# Patient Record
Sex: FEMALE | Employment: OTHER | URBAN - METROPOLITAN AREA
[De-identification: names, ages, dates, MRNs, and addresses within clinical notes are randomized per-mention and may not be internally consistent; named-entity substitution may affect disease eponyms.]

---

## 2023-11-02 ENCOUNTER — OFFICE VISIT (OUTPATIENT)
Dept: PODIATRY | Facility: CLINIC | Age: 63
End: 2023-11-02
Payer: MEDICARE

## 2023-11-02 VITALS
HEART RATE: 77 BPM | BODY MASS INDEX: 32.61 KG/M2 | HEIGHT: 64 IN | WEIGHT: 191 LBS | DIASTOLIC BLOOD PRESSURE: 92 MMHG | SYSTOLIC BLOOD PRESSURE: 130 MMHG

## 2023-11-02 DIAGNOSIS — E11.9 COMPREHENSIVE DIABETIC FOOT EXAMINATION, TYPE 2 DM, ENCOUNTER FOR: ICD-10-CM

## 2023-11-02 DIAGNOSIS — M20.11 VALGUS DEFORMITY OF BOTH GREAT TOES: ICD-10-CM

## 2023-11-02 DIAGNOSIS — D21.22 FIBROMA OF LEFT FOOT: ICD-10-CM

## 2023-11-02 DIAGNOSIS — E11.9 TYPE 2 DIABETES MELLITUS WITHOUT COMPLICATION, WITHOUT LONG-TERM CURRENT USE OF INSULIN: Primary | ICD-10-CM

## 2023-11-02 DIAGNOSIS — M20.12 VALGUS DEFORMITY OF BOTH GREAT TOES: ICD-10-CM

## 2023-11-02 PROCEDURE — 99204 OFFICE O/P NEW MOD 45 MIN: CPT | Mod: PBBFAC,PN | Performed by: PODIATRIST

## 2023-11-02 PROCEDURE — 99999 PR PBB SHADOW E&M-NEW PATIENT-LVL IV: CPT | Mod: PBBFAC,,, | Performed by: PODIATRIST

## 2023-11-02 PROCEDURE — 99203 OFFICE O/P NEW LOW 30 MIN: CPT | Mod: S$PBB,,, | Performed by: PODIATRIST

## 2023-11-02 PROCEDURE — 99999 PR PBB SHADOW E&M-NEW PATIENT-LVL IV: ICD-10-PCS | Mod: PBBFAC,,, | Performed by: PODIATRIST

## 2023-11-02 PROCEDURE — 99203 PR OFFICE/OUTPT VISIT, NEW, LEVL III, 30-44 MIN: ICD-10-PCS | Mod: S$PBB,,, | Performed by: PODIATRIST

## 2023-11-02 RX ORDER — LEVOTHYROXINE SODIUM 75 UG/1
75 TABLET ORAL
COMMUNITY
Start: 2023-08-17

## 2023-11-02 RX ORDER — MAGNESIUM GLUCONATE 27.5 (500)
TABLET ORAL ONCE
COMMUNITY

## 2023-11-02 RX ORDER — ACETAMINOPHEN 500 MG
TABLET ORAL DAILY
COMMUNITY

## 2023-11-02 RX ORDER — BUPROPION HYDROCHLORIDE 150 MG/1
150 TABLET ORAL
COMMUNITY
Start: 2023-10-04

## 2023-11-02 RX ORDER — TRAMADOL HYDROCHLORIDE 50 MG/1
50 TABLET ORAL
COMMUNITY
Start: 2023-10-04

## 2023-11-02 RX ORDER — LOSARTAN POTASSIUM 100 MG/1
100 TABLET ORAL
COMMUNITY
Start: 2023-09-01

## 2023-11-02 RX ORDER — AMLODIPINE BESYLATE 10 MG/1
10 TABLET ORAL
COMMUNITY
Start: 2023-10-04

## 2023-11-02 RX ORDER — METFORMIN HYDROCHLORIDE 500 MG/1
500 TABLET ORAL
COMMUNITY
Start: 2023-08-17

## 2023-11-02 RX ORDER — OMEPRAZOLE 40 MG/1
40 CAPSULE, DELAYED RELEASE ORAL
COMMUNITY
Start: 2023-10-09

## 2023-11-02 RX ORDER — ROSUVASTATIN CALCIUM 40 MG/1
40 TABLET, COATED ORAL
COMMUNITY
Start: 2023-08-03

## 2023-11-05 PROBLEM — E11.9 TYPE 2 DIABETES MELLITUS WITHOUT COMPLICATION, WITHOUT LONG-TERM CURRENT USE OF INSULIN: Status: ACTIVE | Noted: 2023-11-05

## 2023-11-05 PROBLEM — M20.11 VALGUS DEFORMITY OF BOTH GREAT TOES: Status: ACTIVE | Noted: 2023-11-05

## 2023-11-05 PROBLEM — E11.9 COMPREHENSIVE DIABETIC FOOT EXAMINATION, TYPE 2 DM, ENCOUNTER FOR: Status: ACTIVE | Noted: 2023-11-05

## 2023-11-05 PROBLEM — D21.22 FIBROMA OF LEFT FOOT: Status: ACTIVE | Noted: 2023-11-05

## 2023-11-05 PROBLEM — M20.12 VALGUS DEFORMITY OF BOTH GREAT TOES: Status: ACTIVE | Noted: 2023-11-05

## 2023-11-05 NOTE — PROGRESS NOTES
Subjective:       Patient ID: Mildred Edmonds is a 63 y.o. female.    Chief Complaint: No chief complaint on file.  Patient presents today for a new patient diabetic evaluation she is a type 2 diabetic and she states that about 10 months ago she noticed a lump on the bottom of her left foot that has been causing her pain she states it is gotten painful over the last several months.  Patient states she has a bad right knee and she is concerned that it may be her compensation putting more pressure on the left foot which is cause this area to become painful.  Patient has sought treatment from another provider but states she was not happy with the treatment.    Past Medical History:   Diagnosis Date    Diabetes mellitus, type 2     Hyperlipidemia     Neuromuscular disorder     Unspecified osteoarthritis, unspecified site      Past Surgical History:   Procedure Laterality Date    CHOLECYSTECTOMY      HYSTERECTOMY           Social History     Socioeconomic History    Marital status:    Tobacco Use    Smoking status: Never     Passive exposure: Never    Smokeless tobacco: Never   Substance and Sexual Activity    Alcohol use: Not Currently    Drug use: Never    Sexual activity: Yes       Current Outpatient Medications   Medication Sig Dispense Refill    amLODIPine (NORVASC) 10 MG tablet Take 10 mg by mouth.      buPROPion (WELLBUTRIN XL) 150 MG TB24 tablet Take 150 mg by mouth.      cholecalciferol, vitamin D3, (VITAMIN D3) 50 mcg (2,000 unit) Cap capsule Take by mouth once daily.      levothyroxine (SYNTHROID) 75 MCG tablet Take 75 mcg by mouth.      losartan (COZAAR) 100 MG tablet Take 100 mg by mouth.      magnesium gluconate 27.5 mg magne- sium (500 mg) Tab Take by mouth once.      metFORMIN (GLUCOPHAGE) 500 MG tablet Take 500 mg by mouth.      omeprazole (PRILOSEC) 40 MG capsule Take 40 mg by mouth.      rosuvastatin (CRESTOR) 40 MG Tab Take 40 mg by mouth.      traMADoL (ULTRAM) 50 mg tablet Take 50 mg by mouth.  "     TURMERIC ORAL Take by mouth.       No current facility-administered medications for this visit.     Review of patient's allergies indicates:  No Known Allergies    Review of Systems   Musculoskeletal:  Positive for arthralgias, gait problem and joint swelling.   All other systems reviewed and are negative.      Objective:      Vitals:    11/02/23 1548   BP: (!) 130/92   BP Location: Right arm   Patient Position: Sitting   Pulse: 77   Weight: 86.6 kg (191 lb)   Height: 5' 4" (1.626 m)     Physical Exam  Vitals and nursing note reviewed.   Constitutional:       Appearance: Normal appearance.   Cardiovascular:      Pulses:           Dorsalis pedis pulses are 1+ on the right side and 1+ on the left side.        Posterior tibial pulses are 1+ on the right side and 1+ on the left side.   Pulmonary:      Effort: Pulmonary effort is normal.   Musculoskeletal:         General: Swelling, tenderness and deformity present.      Right foot: Bunion present.      Left foot: Deformity and bunion present.        Feet:    Feet:      Right foot:      Protective Sensation: 3 sites tested.  3 sites sensed.      Toenail Condition: Right toenails are abnormally thick. Fungal disease present.     Left foot:      Protective Sensation: 3 sites tested.  3 sites sensed.      Skin integrity: Erythema and warmth present.      Toenail Condition: Left toenails are abnormally thick. Fungal disease present.  Skin:     Capillary Refill: Capillary refill takes 2 to 3 seconds.      Findings: Erythema present.   Neurological:      General: No focal deficit present.      Mental Status: She is alert.   Psychiatric:         Mood and Affect: Mood normal.         Behavior: Behavior normal.                            Assessment:       1. Type 2 diabetes mellitus without complication, without long-term current use of insulin    2. Comprehensive diabetic foot examination, type 2 DM, encounter for    3. Fibroma of left foot    4. 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  Patient states she has a bad right knee and she is concerned that it may be her compensation putting more pressure on the left foot which is cause this area to become painful.  Patient has sought treatment from another provider but states she was not happy with the treatment.  A comprehensive new patient diabetic evaluation was performed patient has no skin breaks no signs of infection she does have significant bunion deformities bilateral that are not currently causing her any pain or discomfort.  Patient has very prominent styloid process bilateral she does have a palpable deformity on the plantar aspect of the styloid process left this is consistent with a plantar fibroma I did discuss at length and in detail the plantar fibroma with the patient advising the patient that the only way to get rid this fibroma is surgical excision and I would only recommend that if all conservative measures fail.  I made the patient aware typically excision of a fibroma require 4 weeks of nonweightbearing following removal for the area to completely heal and this could possibly be longer with her history of diabetes.  Comprehensive diabetic evaluation performed no signs of ulceration or skin break noted diabetic education provided.  I did recommend conservative care regarding the fibroma I have given the patient a blue arch pad to place in the arch of her shoe I have dispensed additional pads to her to try to offload pressure from the lateral aspect of the patient's left foot I am also going to have the patient start to apply Voltaren gel 3 times a day to this area to help settle down the inflammation additionally I have recommended the  application of Vicks vapor rub to the patient's toenails to help address the fungal infection making the patient aware this can take 4-6 months to be effective but it needs to be applied twice daily.  Patient was in understanding and agreement she understands if she is not seeing signs of improvement over the next several weeks she will contact us for further evaluation and treatment as necessary x-rays were not necessary at this time however the patient has continued discomfort and is not responding to conservative measures I would recommend x-rays at that time.This note was created using [employer] voice recognition software that occasionally misinterpreted phrases or words.

## 2024-12-30 ENCOUNTER — TELEPHONE (OUTPATIENT)
Dept: SURGERY | Facility: CLINIC | Age: 64
End: 2024-12-30
Payer: MEDICARE

## 2024-12-30 NOTE — TELEPHONE ENCOUNTER
----- Message from JUJU Maldonado sent at 12/27/2024  4:01 PM CST -----  Needs [facility] [place]  ----- Message -----  From: Joycelyn Rodriguez  Sent: 12/24/2024  10:40 AM CST  To: Latricia Cowart Staff    [facility],    Patient is being referred for benign neoplasm of parathyroid gland,  hyperparathyroidism. Referral/records are scanned in media mgr. Please contact patient to schedule.    Thanks

## 2025-01-02 ENCOUNTER — TELEPHONE (OUTPATIENT)
Dept: SURGERY | Facility: CLINIC | Age: 65
End: 2025-01-02
Payer: MEDICARE

## 2025-01-31 NOTE — PROGRESS NOTES
"Subjective:      Patient ID: Mildred Edmonds is a 64 y.o. female.    Chief Complaint:  No chief complaint on file.    History of Present Illness  Mildred Edmonds is here for Initial evaluation for hyperparathyroidism.   This is their first visit with me.      With regards to the hypercalcemia or primary hyperparathyroidism:        Daily intake of calcium is: ***  Daily intake of vitamin D:  D3 2000 iu daily    BMD:  ***     *** constipation, depression, polyuria, muscle aches or pains.   *** fractures, height loss or kidney stones.   *** history of renal disease.   *** family history of hyperparathyroidism or calcium problems.   *** HCTZ., lithium, or chlorthiadone use.      6 indications for surgery:  CKD  <50 yoa  + kidney stones  Serum Ca > 11.5  Urine Ca >400   Osteoporosis    Review of Systems    Objective:   Physical Exam    There were no vitals taken for this visit.    There is no height or weight on file to calculate BMI.    Lab Review:   No results found for: "HGBA1C"    No results found for: "CHOL", "HDL", "LDLCALC", "TRIG", "CHOLHDL"  No results found for: "NA", "K", "CL", "CO2", "GLU", "BUN", "CREATININE", "CALCIUM", "PROT", "ALBUMIN", "BILITOT", "ALKPHOS", "AST", "ALT", "ANIONGAP", "ESTGFRAFRICA", "EGFRNONAA", "TSH"  No results found for: "PTHCDEXO24BX"  Assessment and Plan   No diagnosis found.    No problem-specific Assessment & Plan notes found for this encounter.      No follow-ups on file.    Case discussed with ***.  Recommendations were discussed with the patient in detail.  The patient verbalized understanding and agrees with the plan outlined as above.     "

## 2025-02-03 ENCOUNTER — OFFICE VISIT (OUTPATIENT)
Dept: ENDOCRINOLOGY | Facility: CLINIC | Age: 65
End: 2025-02-03
Payer: MEDICARE

## 2025-02-03 ENCOUNTER — OFFICE VISIT (OUTPATIENT)
Dept: SURGERY | Facility: CLINIC | Age: 65
End: 2025-02-03
Payer: MEDICARE

## 2025-02-03 VITALS
HEART RATE: 62 BPM | BODY MASS INDEX: 33.12 KG/M2 | SYSTOLIC BLOOD PRESSURE: 138 MMHG | WEIGHT: 194 LBS | HEIGHT: 64 IN | DIASTOLIC BLOOD PRESSURE: 86 MMHG

## 2025-02-03 VITALS
DIASTOLIC BLOOD PRESSURE: 86 MMHG | OXYGEN SATURATION: 96 % | RESPIRATION RATE: 18 BRPM | WEIGHT: 194 LBS | HEIGHT: 64 IN | SYSTOLIC BLOOD PRESSURE: 138 MMHG | HEART RATE: 62 BPM | BODY MASS INDEX: 33.12 KG/M2

## 2025-02-03 DIAGNOSIS — E11.9 COMPREHENSIVE DIABETIC FOOT EXAMINATION, TYPE 2 DM, ENCOUNTER FOR: ICD-10-CM

## 2025-02-03 DIAGNOSIS — E21.3 HYPERPARATHYROIDISM: Primary | ICD-10-CM

## 2025-02-03 DIAGNOSIS — E11.9 TYPE 2 DIABETES MELLITUS WITHOUT COMPLICATION, WITHOUT LONG-TERM CURRENT USE OF INSULIN: ICD-10-CM

## 2025-02-03 DIAGNOSIS — E83.52 HYPERCALCEMIA: Primary | ICD-10-CM

## 2025-02-03 PROCEDURE — 99999 PR PBB SHADOW E&M-EST. PATIENT-LVL V: CPT | Mod: PBBFAC,,, | Performed by: INTERNAL MEDICINE

## 2025-02-03 PROCEDURE — 99999 PR PBB SHADOW E&M-EST. PATIENT-LVL III: CPT | Mod: PBBFAC,,, | Performed by: STUDENT IN AN ORGANIZED HEALTH CARE EDUCATION/TRAINING PROGRAM

## 2025-02-03 PROCEDURE — 99213 OFFICE O/P EST LOW 20 MIN: CPT | Mod: PBBFAC,27 | Performed by: STUDENT IN AN ORGANIZED HEALTH CARE EDUCATION/TRAINING PROGRAM

## 2025-02-03 PROCEDURE — 99215 OFFICE O/P EST HI 40 MIN: CPT | Mod: PBBFAC | Performed by: INTERNAL MEDICINE

## 2025-02-03 PROCEDURE — 99204 OFFICE O/P NEW MOD 45 MIN: CPT | Mod: S$PBB,,, | Performed by: INTERNAL MEDICINE

## 2025-02-03 PROCEDURE — 99203 OFFICE O/P NEW LOW 30 MIN: CPT | Mod: S$PBB,,, | Performed by: STUDENT IN AN ORGANIZED HEALTH CARE EDUCATION/TRAINING PROGRAM

## 2025-02-03 PROCEDURE — G2211 COMPLEX E/M VISIT ADD ON: HCPCS | Mod: S$PBB,,, | Performed by: INTERNAL MEDICINE

## 2025-02-03 NOTE — PROGRESS NOTES
"Subjective:      Patient ID: Mildred Edmonds is a 64 y.o. female.    Chief Complaint:  Hyperparathyroidism      History of Present Illness  Ms. Edmonds presents for evaluation and management of hyperparathyroidism.     Has active history of hypothyroidism, HTN, CKD3, GERD and DMII.    Noted to have elevated PTH with elevated serum calcium    Calcium, PTH, and Vit D levels:  6/2024: Ca 10.5, albumin 4.6, GFR 51, Vit D 49,  (on HCTZ)  10/2024: Ca 10.4, albumin 4.4, Phos 3.3, GFR 53 (off HCTZ)    No history of fragility fracture.    Reviewed bone density report from 2024:  Osteopenia, FRAX 8.8%, 1.1%    Reported history of possibly passing kidney stone in 2022. GFR <60.     Has never taken lithium. Was on HCTZ in the past and stopped around 8/2024. No excessive calcium intake. Takes Vit D supplements 2000 once daily.    Has been off of a PPI for one year.     No family history of hypercalcemia or hyperparathyroidism. No personal history of elevated serum calcium at a young age.     No depression/low mood, bone pain, abdominal pain or constipation.        Reviewed report from neck CT scan dated 11/2024: bilateral 5 mm lesions below the right and left inferior poles that hypoenhance (lymph nodes?)    NM parathyroid scan 10/2024: incomplete washout from left thyroid lobe but no definitive adenoma    Review of Systems   Constitutional:  Negative for chills and fever.   Gastrointestinal:  Negative for nausea.       Objective:   Physical Exam  Vitals and nursing note reviewed.       BP Readings from Last 3 Encounters:   02/03/25 138/86   02/03/25 138/86   11/02/23 (!) 130/92     Wt Readings from Last 1 Encounters:   02/03/25 0906 88 kg (194 lb 0.1 oz)       Body mass index is 33.3 kg/m².    Lab Review:   No results found for: "HGBA1C"  No results found for: "CHOL", "HDL", "LDLCALC", "TRIG", "CHOLHDL"  No results found for: "NA", "K", "CL", "CO2", "GLU", "BUN", "CREATININE", "CALCIUM", "PROT", "ALBUMIN", "BILITOT", " ""ALKPHOS", "AST", "ALT", "ANIONGAP", "ESTGFRAFRICA", "EGFRNONAA", "TSH"      Assessment and Plan     Hyperparathyroidism  --Patient with PTH-mediated hypercalcemia  --Possible primary HPT, but she was on a thiazide previously and calcium was mildly elevated, will need to repeat eval now that she is off of HCTZ for several months  --Will need to repeat labs and check 24 hr urine for calcium/creatinine  --Will also get neck ultrasound now  --She had a nuclear medicine scan and neck CT that were not definitive for localization  --Has GFR <60 and history of nephrolithiasis  --Reviewed case with endocrine surgery who are in agreement    Type 2 diabetes mellitus without complication, without long-term current use of insulin  --On single agent metformin  --Managed by PCP      Needs 24 hr urine for calcium and creatinine dropped off at [facility] with labs the morning she drops the jug off for renal panel, PTH and vitamin D, needs ultrasound at [facility] when able      Visit today included increased complexity associated with the care of the episodic problem hyperparathyroidism addressed and managing the longitudinal care of the patient due to the serious and/or complex managed problem(s).     Prieto Bennett M.D. Staff Endocrinology     "

## 2025-02-03 NOTE — PROGRESS NOTES
Endocrine Surgery History & Physical     REFERRING PROVIDER: Dr. Garnica, [facility]    ENDOCRINOLOGIST: Established at [facility] [place]    REASON FOR VISIT: Hyperparathyroidism    HPI: Mildred Edmonds is a 64 y.o. female patient with a history notable for DM2, HTN, HLD, OA,  and hypothyroidism who presents in consultation for primary hyperparathyroidism.  Suspicion for hyperparathyroidism was raised on routine laboratory testing to have mildly elevated calcium levels.      Details of the workup are as follows:     Recent laboratory studies:  Hypercalcemia noted since 6/2024  Max calcium elevation to 10.5 mg/dL  Parathyroid hormone levels elevated with hypercalcemia   Most recent PTH level was 130 with a corresponding calcium of 10.4, with an albumin of 4.4  Phosphorus: 3.3  Vitamin D: 49 in 6/2024  24 hour urine calcium: not done    Medications:  Vitamin D supplementation: 2000u D3  Lithium, thiazide diuretics: previously on HCTZ, now off. Not totally sure of stop date  Calcium supplements or calcimimetic: none    Symptoms:   The patient reports the following symptoms: [x]musculoskeletal pain (OA), []fractures, [x]nephrolithiasis, []GERD, []peptic ulcer disease, []abdominal pain, []polydipsia, []polyuria, []pruritis  The patient reports the following neurocognitive symptoms: []brain fog, []concentration difficulties, []fatigue, []forgetfulness, []mood/psychiatric disturbances  The patient denies dysphagia, globus sensation, compression symptoms, or anterior neck pain.  The patient denies hoarseness, voice changes or increased need to clear the throat.  Bone mineral density: []Osteoporosis [x]Osteopenia []Normal bone density.  Last DEXA: unknown    Surgical risk factors:  Risk of concurrent thyroid disease: has hypothyroidism   Ultrasound of the thyroid: none   History of neck radiation: none  Prior neck surgery: none  Prior gastric or bypass surgery: none  Inflammatory bowel disease or  malabsorption: none  Cardiovascular risk: likely low  Antiplatelet therapy and anticoagulation: none    Family history:  No family history of endocrinopathies or endocrine cancers including pituitary tumors, pancreatic neuroendocrine tumors, medullary thyroid cancer or pheochromocytoma/paraganglioma.     Localization studies done prior to this visit:  Ultrasound: unknown   Nuclear Medicine Parathyroid Scan: did not lateralize, though was not timed well   4D-CT Parathyroid scan: possible bilateral per read    LABORATORY STUDIES:  I personally and independently reviewed relevant lab test results, including the following:        Lab Results   Component Value Date    CALCIUM 10.0 02/04/2025    PTH 68.7 02/04/2025    ALBUMIN 3.6 02/04/2025    PHOS 3.0 02/04/2025    [id number] 42 02/04/2025       PAST MEDICAL HISTORY:  Patient Active Problem List   Diagnosis    Type 2 diabetes mellitus without complication, without long-term current use of insulin    Fibroma of left foot    Valgus deformity of both great toes    Hypercalcemia    Hyperparathyroidism         PAST SURGICAL HISTORY:  Past Surgical History:   Procedure Laterality Date    CHOLECYSTECTOMY      HYSTERECTOMY          MEDICATIONS:  Current Outpatient Medications   Medication Sig Dispense Refill    amLODIPine (NORVASC) 10 MG tablet Take 10 mg by mouth.      buPROPion (WELLBUTRIN XL) 150 MG TB24 tablet Take 150 mg by mouth.      cholecalciferol, vitamin D3, (VITAMIN D3) 50 mcg (2,000 unit) Cap capsule Take by mouth once daily.      levothyroxine (SYNTHROID) 75 MCG tablet Take 75 mcg by mouth.      losartan (COZAAR) 100 MG tablet Take 100 mg by mouth.      magnesium gluconate 27.5 mg magne- sium (500 mg) Tab Take by mouth once.      metFORMIN (GLUCOPHAGE) 500 MG tablet Take 500 mg by mouth.      rosuvastatin (CRESTOR) 40 MG Tab Take 40 mg by mouth.      traMADoL (ULTRAM) 50 mg tablet Take 50 mg by mouth.       No current facility-administered medications for this  "visit.       ALLERGIES:  Review of patient's allergies indicates:  No Known Allergies    SOCIAL HISTORY:  Social History     Tobacco Use    Smoking status: Former     Current packs/day: 0.00     Types: Cigarettes     Quit date: 6/3/2004     Years since quittin.6     Passive exposure: Never    Smokeless tobacco: Former   Substance Use Topics    Alcohol use: Not Currently    Drug use: Never         FAMILY HISTORY:  Family History   Problem Relation Name Age of Onset    Cancer Mother      Cancer Sister          REVIEW OF SYSTEMS:  A detailed review of systems has been reviewed with the patient, pertinent positives and negatives are presented in the note and is otherwise negative.    PHYSICAL EXAMINATION:  Vital Signs: /86   Pulse 62   Ht 5' 4" (1.626 m)   Wt 88 kg (194 lb 0.1 oz)   BMI 33.30 kg/m²     Constitutional: well-developed, well-nourished, no acute distress   HENT: no lid lag, no exophthalmos, no scleral icterus, moist mucous membranes, good dentition  Neck: supple, trachea in midline, thyroid is normal and moves well with swallowing, no palpable nodules, normal neck extension  Heme/Lymph: no cervical or supraclavicular lymphadenopathy  Respiratory: normal respiratory effort  Cardiovascular: regular rate and rhythm  Extremities: trace LE edema   Skin: warm and dry, no rashes  Vascular: radial pulses palpable bilaterally  Psychiatric: affect normal    IMAGING STUDIES:  I personally and independently reviewed, visualized and interpreted the 4D-CT scan (the nuclear imaging study was not available) and am not convinced the identified lesions in the CT represent parathyroid glands. They appear hypoenhancing and may be more consistent with lymph nodes.  Reports below for reference.    NM parathyroid scan 10/2024      4D-CT Parathyroid 2024          Surgeon-directed focused bedside US: not performed    IMPRESSION AND PLAN:  I had the pleasure of seeing Ms. Edmonds in Endocrine Surgical consultation " regarding the her hypercalcemia.     We discussed that hyperparathyroidism can compromise bone and cardiovascular health. In addition to classic symptoms such as kidney stones and bone loss, hyperparathyroidism can be associated with multiple symptoms including fatigue, depression, memory loss, pruritis, polyuria, nocturia, constipation, polydipsia, and musculoskeletal aches and pains. Hyperparathyroidism can also worsen hypertension. I also discussed that I am not convinced that she has a diagnosis of primary hyperparathyroidism at this point and that more testing is needed.      All questions were answered and the patient expressed understanding of all the risks, benefits and alternatives, and agreed to proceed with the plan despite the risks.        1. Hypercalcemia  Assessment & Plan:  She has had mildly elevated/high normal calcium since June of 2024. That said, she was, at some point, on HCTZ and has since stopped. Ongoing workup to confirm primary hyperparathyroidism. She was seen today in the [facility] with endocrinology who plan to obtain further testing.     - 24 hr urine calcium   - repeat labs   - obtain ultrasound with endocrinology   - obtain images from CT and NM scans  - will follow-up in clinic after results obtained        Patient was seen and evaluated with surgery resident MD Sofya Dee MD, FACS  Staff Surgeon  Endocrine Surgery  2/3/25

## 2025-02-03 NOTE — ASSESSMENT & PLAN NOTE
She has had mildly elevated/high normal calcium since June of 2024. That said, she was, at some point, on HCTZ and has since stopped. Ongoing workup to confirm primary hyperparathyroidism. She was seen today in the [facility] with endocrinology who plan to obtain further testing.     - 24 hr urine calcium   - repeat labs   - obtain ultrasound with endocrinology   - obtain images from CT and NM scans  - will follow-up in clinic after results obtained

## 2025-02-04 ENCOUNTER — HOSPITAL ENCOUNTER (OUTPATIENT)
Dept: RADIOLOGY | Facility: HOSPITAL | Age: 65
Discharge: HOME | End: 2025-02-04
Attending: INTERNAL MEDICINE
Payer: MEDICARE

## 2025-02-04 DIAGNOSIS — E21.3 HYPERPARATHYROIDISM: ICD-10-CM

## 2025-02-04 PROBLEM — E11.9 COMPREHENSIVE DIABETIC FOOT EXAMINATION, TYPE 2 DM, ENCOUNTER FOR: Status: RESOLVED | Noted: 2023-11-05 | Resolved: 2025-02-04

## 2025-02-04 PROCEDURE — 76536 US EXAM OF HEAD AND NECK: CPT | Mod: TC

## 2025-02-04 PROCEDURE — 76536 US EXAM OF HEAD AND NECK: CPT | Mod: 26,,, | Performed by: RADIOLOGY

## 2025-02-05 ENCOUNTER — PATIENT MESSAGE (OUTPATIENT)
Dept: ENDOCRINOLOGY | Facility: CLINIC | Age: 65
End: 2025-02-05
Payer: MEDICARE

## 2025-02-07 ENCOUNTER — LAB VISIT (OUTPATIENT)
Dept: LAB | Facility: HOSPITAL | Age: 65
End: 2025-02-07
Attending: INTERNAL MEDICINE
Payer: MEDICARE

## 2025-02-07 DIAGNOSIS — E21.3 HYPERPARATHYROIDISM: ICD-10-CM

## 2025-02-07 LAB
CALCIUM 24H UR-MRATE: 4 MG/HR (ref 4–12)
CALCIUM UR-MCNC: 3.4 MG/DL (ref 0–15)
CALCIUM URINE (MG/SPEC): 105 MG/SPEC
CREAT 24H UR-MRATE: 41.3 MG/HR (ref 40–75)
CREAT UR-MCNC: 32 MG/DL (ref 15–325)
CREATININE, URINE (MG/SPEC): 992 MG/SPEC
URINE COLLECTION DURATION: 24 HR
URINE COLLECTION DURATION: 24 HR
URINE VOLUME: 3100 ML
URINE VOLUME: 3100 ML

## 2025-02-07 PROCEDURE — 82570 ASSAY OF URINE CREATININE: CPT | Performed by: INTERNAL MEDICINE

## 2025-02-07 PROCEDURE — 82340 ASSAY OF CALCIUM IN URINE: CPT | Performed by: INTERNAL MEDICINE

## 2025-02-13 ENCOUNTER — OFFICE VISIT (OUTPATIENT)
Dept: SURGERY | Facility: CLINIC | Age: 65
End: 2025-02-13
Payer: MEDICARE

## 2025-02-13 VITALS
BODY MASS INDEX: 32.6 KG/M2 | OXYGEN SATURATION: 99 % | HEART RATE: 64 BPM | WEIGHT: 190.94 LBS | HEIGHT: 64 IN | SYSTOLIC BLOOD PRESSURE: 123 MMHG | DIASTOLIC BLOOD PRESSURE: 72 MMHG

## 2025-02-13 DIAGNOSIS — E21.3 HYPERPARATHYROIDISM: Primary | ICD-10-CM

## 2025-02-13 DIAGNOSIS — E83.52 HYPERCALCEMIA: ICD-10-CM

## 2025-02-13 PROCEDURE — 99999 PR PBB SHADOW E&M-EST. PATIENT-LVL III: CPT | Mod: PBBFAC,,, | Performed by: STUDENT IN AN ORGANIZED HEALTH CARE EDUCATION/TRAINING PROGRAM

## 2025-02-13 PROCEDURE — 99213 OFFICE O/P EST LOW 20 MIN: CPT | Mod: PBBFAC | Performed by: STUDENT IN AN ORGANIZED HEALTH CARE EDUCATION/TRAINING PROGRAM

## 2025-02-13 NOTE — ASSESSMENT & PLAN NOTE
Fluctuating calcium levels.  Mildly elevated to high normal calcium since June of 2024.  Was on HCTZ when calcium was mildly elevated, and has since stopped.  See Hyperparathyroidism for additional plan.    - Advised 1200 mg of calcium via dietary sources daily  - Maintain adequate hydration  - Avoid HCTZ/thiazides

## 2025-02-13 NOTE — ASSESSMENT & PLAN NOTE
Ongoing workup to confirm primary versus secondary hyperparathyroidism was pursued.  Normal 24 hour urine calcium and high-normal serum calcium with high-normal PTH.    - Biochemical workup most consistent with secondary hyperparathyroidism  - May be an early primary hyperparathyroid scenario, so would recommend ongoing monitoring  - Stay off HCTZ  - 1200 mg calcium via dietary sources daily  - [id number] IU over the counter vitamin D3 daily  - Maintain adequate hydration  - Annual PTH, calcium, phosphorus, albumin, vitamin D level  - Patient sees Dr. Garnica regularly, can forward results to me if she has the above labs drawn  - Alternatively she can come back to see me or Dr. Bennett ([facility])  - Will set reminder to check in with patient in 2 years

## 2025-02-13 NOTE — PROGRESS NOTES
Endocrine Surgery History & Physical     REFERRING PROVIDER: Dr. Garnica, [facility]    ENDOCRINOLOGIST: Established at [facility] / [place]    REASON FOR VISIT: Hyperparathyroidism    HPI: Mildred Edmonds is a 64 y.o. female patient with a history notable for DM2, HTN, HLD, OA,  and hypothyroidism who presents in consultation for primary hyperparathyroidism.  Suspicion for hyperparathyroidism was raised on routine laboratory testing to have mildly elevated calcium levels.      Interval History 2/13/2025: The patient returns to the office today to review the labs and 24 hour urine studies.  The urine calcium was normal with adequate collection.  Serum calcium was high-normal with a high-normal PTH, normal phosphorus and replete vitamin D.  Biochemical profile most consistent with secondary hyperparathyroidism, though it may represent an early version of primary hyperparathyroidism.  Reiterated that imaging findings are not indications for surgical management and a diagnosis of primary hyperparathyroidism is made biochemically.  She remains off HCTZ and is motivated to continue increasing her activities, protect her kidney function, and monitor her labs.      Details of the workup are as follows:     Recent laboratory studies:  Hypercalcemia noted since 6/2024  Max calcium elevation to 10.5 mg/dL  Parathyroid hormone levels elevated with borderline calcium in the past  Most recent PTH level was 68.7 with a corresponding calcium of 10.0, wich corrects to 10.3 for an albumin of 4.0   Phosphorus: 3.3  Vitamin D: 42 in  2025  24 hour urine calcium: 105    Medications:  Vitamin D supplementation: 2000u D3  Lithium, thiazide diuretics: previously on HCTZ, now off. Not totally sure of stop date  Calcium supplements or calcimimetic: none    Symptoms:   The patient reports the following symptoms: [x]musculoskeletal pain (OA), []fractures, [x]nephrolithiasis, []GERD, []peptic ulcer disease, []abdominal pain,  []polydipsia, []polyuria, []pruritis  The patient reports the following neurocognitive symptoms: []brain fog, []concentration difficulties, []fatigue, []forgetfulness, []mood/psychiatric disturbances  The patient denies dysphagia, globus sensation, compression symptoms, or anterior neck pain.  The patient denies hoarseness, voice changes or increased need to clear the throat.  Bone mineral density: []Osteoporosis [x]Osteopenia []Normal bone density.  Last DEXA: unknown    Surgical risk factors:  Risk of concurrent thyroid disease: has hypothyroidism   Ultrasound of the thyroid: none   History of neck radiation: none  Prior neck surgery: none  Prior gastric or bypass surgery: none  Inflammatory bowel disease or malabsorption: none  Cardiovascular risk: likely low  Antiplatelet therapy and anticoagulation: none    Family history:  No family history of endocrinopathies or endocrine cancers including pituitary tumors, pancreatic neuroendocrine tumors, medullary thyroid cancer or pheochromocytoma/paraganglioma.     Localization studies done prior to this visit:  Ultrasound: unknown   Nuclear Medicine Parathyroid Scan: did not lateralize, though was not timed well   4D-CT Parathyroid scan: possible bilateral per read    LABORATORY STUDIES:  I personally and independently reviewed relevant lab test results, including the following:        Lab Results   Component Value Date    CALCIUM 10.0 02/04/2025    PTH 68.7 02/04/2025    ALBUMIN 3.6 02/04/2025    PHOS 3.0 02/04/2025    [id number] 42 02/04/2025     Component      Latest Ref Rng 2/4/2025 2/7/2025   Glucose      70 - 110 mg/dL 100     Sodium      136 - 145 mmol/L 138     Potassium      3.5 - 5.1 mmol/L 4.4     Chloride      95 - 110 mmol/L 110     CO2      23 - 29 mmol/L 21 (L)     BUN      8 - 23 mg/dL 12     Calcium      8.7 - 10.5 mg/dL 10.0     Creatinine      0.5 - 1.4 mg/dL 1.1     Albumin      3.5 - 5.2 g/dL 3.6     Phosphorus Level      2.7 - 4.5 mg/dL 3.0      eGFR      >60 mL/min/1.73 m^2 55.8 !     Anion Gap      8 - 16 mmol/L 7 (L)     Urine Total Volume      mL  3100    Urine Total Volume      mL  3100    Urine Collection Duration      Hr  24    Urine Collection Duration      Hr  24    Calcium, Urine      0.0 - 15.0 mg/dL  3.4    Calcium, Timed Urine      4 - 12 mg/Hr  4    Urine Calcium Absolute       mg/Spec  105    CREATININE, URINE (SEND OUT)      15.0 - 325.0 mg/dL  32.0    Creatinine, Timed Urine      40.0 - 75.0 mg/Hr  41.3    Urine Creatinine Absolute      mg/Spec  992.0    PTH      9.0 - 77.0 pg/mL 68.7       PAST MEDICAL HISTORY:  Patient Active Problem List   Diagnosis    Type 2 diabetes mellitus without complication, without long-term current use of insulin    Fibroma of left foot    Valgus deformity of both great toes    Hypercalcemia    Hyperparathyroidism         PAST SURGICAL HISTORY:  Past Surgical History:   Procedure Laterality Date    CHOLECYSTECTOMY      HYSTERECTOMY          MEDICATIONS:  Current Outpatient Medications   Medication Sig Dispense Refill    amLODIPine (NORVASC) 10 MG tablet Take 10 mg by mouth.      buPROPion (WELLBUTRIN XL) 150 MG TB24 tablet Take 150 mg by mouth.      cholecalciferol, vitamin D3, (VITAMIN D3) 50 mcg (2,000 unit) Cap capsule Take by mouth once daily.      levothyroxine (SYNTHROID) 75 MCG tablet Take 75 mcg by mouth.      losartan (COZAAR) 100 MG tablet Take 100 mg by mouth.      magnesium gluconate 27.5 mg magne- sium (500 mg) Tab Take by mouth once.      metFORMIN (GLUCOPHAGE) 500 MG tablet Take 500 mg by mouth.      rosuvastatin (CRESTOR) 40 MG Tab Take 40 mg by mouth.      traMADoL (ULTRAM) 50 mg tablet Take 50 mg by mouth.       No current facility-administered medications for this visit.       ALLERGIES:  Review of patient's allergies indicates:  No Known Allergies    SOCIAL HISTORY:  Social History     Tobacco Use    Smoking status: Former     Current packs/day: 0.00     Types: Cigarettes     Quit date:  "6/3/2004     Years since quittin.7     Passive exposure: Never    Smokeless tobacco: Former   Substance Use Topics    Alcohol use: Not Currently    Drug use: Never         FAMILY HISTORY:  Family History   Problem Relation Name Age of Onset    Cancer Mother      Cancer Sister          REVIEW OF SYSTEMS:  A detailed review of systems has been reviewed with the patient, pertinent positives and negatives are presented in the note and is otherwise negative.    PHYSICAL EXAMINATION:  Vital Signs: /72   Pulse 64   Ht 5' 4" (1.626 m)   Wt 86.6 kg (190 lb 14.7 oz)   SpO2 99%   BMI 32.77 kg/m²     Constitutional: well-developed, well-nourished, no acute distress   HENT: no lid lag, no exophthalmos, no scleral icterus  Neck: supple, trachea in midline, adequate neck extension  Respiratory: normal respiratory effort  Cardiovascular: regular rate and rhythm  Extremities: no edema  Skin: warm and dry, no rashes on exposed skin  Psychiatric: affect normal    IMAGING STUDIES:  I personally and independently reviewed, visualized and interpreted the 4D-CT scan and now have compared to the nuclear imaging studies and the ultrasound.  Identified lesions on CT, NM and ultrasound are not convincingly parathyroid adenomas based on contrast enhancement and ultrasonographic appearance.  Reports below for reference.    US SOFT TISSUE HEAD NECK 25   TECHNIQUE:Ultrasound of the thyroid and cervical lymph nodes was performed.   COMPARISON:None.     FINDINGS:  Thyroid lobes are atrophic measuring 3.3 x 0.7 x 0.7 cm on the right and 3.3 x 0.7 x 1.2 cm on left.  This measures to a volume of 2.2 cc.  Isthmus measures 0.20 cm.  Thyroid lobes demonstrate normal blood flow by color Doppler.     No cystic or solid thyroid nodules.  Just inferior to the left thyroid lobe there is a well-circumscribed oval hypoechoic nodule measuring 10 x 6 x 5 mm.  Smaller adjacent hypoechoic nodule measures 6 x 3 x 6 mm.     No cervical " lymphadenopathy identified.     Impression:     1. Thyroid atrophy.  2. Small hypoechoic nodule just inferior to the left thyroid lobe are of uncertain etiology.  These may represent small lymph nodes.  Differential diagnosis includes parathyroid adenomas.  Further evaluation with nuclear medicine parathyroid scan as deemed clinically necessary.    NM parathyroid scan 10/2024      4D-CT Parathyroid 11/2024        IMPRESSION AND PLAN:    1. Hyperparathyroidism  Assessment & Plan:  Ongoing workup to confirm primary versus secondary hyperparathyroidism was pursued.  Normal 24 hour urine calcium and high-normal serum calcium with high-normal PTH.    - Biochemical workup most consistent with secondary hyperparathyroidism  - May be an early primary hyperparathyroid scenario, so would recommend ongoing monitoring  - Stay off HCTZ  - 1200 mg calcium via dietary sources daily  - [id number] IU over the counter vitamin D3 daily  - Maintain adequate hydration  - Annual PTH, calcium, phosphorus, albumin, vitamin D level  - Patient sees Dr. Garnica regularly, can forward results to me if she has the above labs drawn  - Alternatively she can come back to see me or Dr. Bennett ([facility])  - Will set reminder to check in with patient in 2 years       2. Hypercalcemia  Assessment & Plan:  Fluctuating calcium levels.  Mildly elevated to high normal calcium since June of 2024.  Was on HCTZ when calcium was mildly elevated, and has since stopped.  See Hyperparathyroidism for additional plan.    - Advised 1200 mg of calcium via dietary sources daily  - Maintain adequate hydration  - Avoid HCTZ/thiazides       Sofya Rome MD, FACS  Staff Surgeon  Endocrine Surgery  2/13/25